# Patient Record
Sex: FEMALE | URBAN - METROPOLITAN AREA
[De-identification: names, ages, dates, MRNs, and addresses within clinical notes are randomized per-mention and may not be internally consistent; named-entity substitution may affect disease eponyms.]

---

## 2019-08-20 ENCOUNTER — APPOINTMENT (RX ONLY)
Dept: URBAN - METROPOLITAN AREA CLINIC 40 | Facility: CLINIC | Age: 59
Setting detail: DERMATOLOGY
End: 2019-08-20

## 2019-08-20 DIAGNOSIS — L259 CONTACT DERMATITIS AND OTHER ECZEMA, UNSPECIFIED CAUSE: ICD-10-CM

## 2019-08-20 DIAGNOSIS — B00.1 HERPESVIRAL VESICULAR DERMATITIS: ICD-10-CM

## 2019-08-20 PROBLEM — L23.9 ALLERGIC CONTACT DERMATITIS, UNSPECIFIED CAUSE: Status: ACTIVE | Noted: 2019-08-20

## 2019-08-20 PROBLEM — L08.9 LOCAL INFECTION OF THE SKIN AND SUBCUTANEOUS TISSUE, UNSPECIFIED: Status: ACTIVE | Noted: 2019-08-20

## 2019-08-20 PROCEDURE — ? PRESCRIPTION MEDICATION MANAGEMENT

## 2019-08-20 PROCEDURE — ? PRESCRIPTION

## 2019-08-20 PROCEDURE — 99203 OFFICE O/P NEW LOW 30 MIN: CPT

## 2019-08-20 PROCEDURE — ? COUNSELING

## 2019-08-20 PROCEDURE — ? ADDITIONAL NOTES

## 2019-08-20 RX ORDER — TRIAMCINOLONE ACETONIDE 0.1 %
OINTMENT (GRAM) TOPICAL
Qty: 1 | Refills: 0 | Status: ERX | COMMUNITY
Start: 2019-08-20

## 2019-08-20 RX ORDER — FLUCONAZOLE 150 MG/1
TABLET ORAL
Qty: 7 | Refills: 0 | Status: ERX | COMMUNITY
Start: 2019-08-20

## 2019-08-20 RX ADMIN — FLUCONAZOLE: 150 TABLET ORAL at 18:46

## 2019-08-20 RX ADMIN — Medication: at 18:48

## 2019-08-20 ASSESSMENT — LOCATION ZONE DERM
LOCATION ZONE: FACE
LOCATION ZONE: TRUNK
LOCATION ZONE: LIP

## 2019-08-20 ASSESSMENT — LOCATION SIMPLE DESCRIPTION DERM
LOCATION SIMPLE: RIGHT LOWER BACK
LOCATION SIMPLE: LEFT CHEEK
LOCATION SIMPLE: UPPER LIP

## 2019-08-20 ASSESSMENT — LOCATION DETAILED DESCRIPTION DERM
LOCATION DETAILED: LEFT SUPERIOR CENTRAL BUCCAL CHEEK
LOCATION DETAILED: PHILTRUM
LOCATION DETAILED: RIGHT SUPERIOR LATERAL MIDBACK

## 2019-08-20 NOTE — PROCEDURE: PRESCRIPTION MEDICATION MANAGEMENT
Samples Given: - Vanicream cleanser
Discontinue Regimen: - All OTC products including make up and lotions
Otc Regimen: - Vanicream products\\n
Initiate Treatment: - TAC ointment \\n- Diflucan 150mg PO QD
Render In Strict Bullet Format?: No
Plan: -avoid lipstick and cosmetic products until next visit
Detail Level: Zone

## 2019-08-20 NOTE — HPI: RASH
What Type Of Note Output Would You Prefer (Optional)?: Standard Output
How Severe Is Your Rash?: moderate
Is This A New Presentation, Or A Follow-Up?: Rash
Additional History: \\n\\nPt states rash appeared on lips first then spread to left cheek and rest of face. Pt was seen by urgent care and was Rx mupiricin and sulfamethoxazole with no relief. Pt went to ED 1D ago was dx with fungus, Rx nystatin applied twice but states it has not helped.

## 2019-08-20 NOTE — PROCEDURE: ADDITIONAL NOTES
Additional Notes: - Patient denies pain, advised to observe for changes
Detail Level: Simple
Additional Notes: - Pt denies any pain, just mild itching. May just be eczematous lesion, but counseled that if symptoms change, pain or tingling occurs, to call office and will start valtrex

## 2019-08-28 ENCOUNTER — APPOINTMENT (RX ONLY)
Dept: URBAN - METROPOLITAN AREA CLINIC 40 | Facility: CLINIC | Age: 59
Setting detail: DERMATOLOGY
End: 2019-08-28

## 2019-08-28 DIAGNOSIS — L81.4 OTHER MELANIN HYPERPIGMENTATION: ICD-10-CM

## 2019-08-28 DIAGNOSIS — L259 CONTACT DERMATITIS AND OTHER ECZEMA, UNSPECIFIED CAUSE: ICD-10-CM | Status: IMPROVED

## 2019-08-28 PROBLEM — L30.9 DERMATITIS, UNSPECIFIED: Status: ACTIVE | Noted: 2019-08-28

## 2019-08-28 PROCEDURE — ? COUNSELING

## 2019-08-28 PROCEDURE — ? PRESCRIPTION

## 2019-08-28 PROCEDURE — ? ADDITIONAL NOTES

## 2019-08-28 PROCEDURE — 99213 OFFICE O/P EST LOW 20 MIN: CPT

## 2019-08-28 PROCEDURE — ? PRESCRIPTION MEDICATION MANAGEMENT

## 2019-08-28 RX ORDER — HYDROCORTISONE 25 MG/G
CREAM TOPICAL
Qty: 1 | Refills: 0 | Status: ERX | COMMUNITY
Start: 2019-08-28

## 2019-08-28 RX ADMIN — HYDROCORTISONE: 25 CREAM TOPICAL at 15:45

## 2019-08-28 ASSESSMENT — LOCATION ZONE DERM
LOCATION ZONE: LIP
LOCATION ZONE: FACE

## 2019-08-28 ASSESSMENT — LOCATION DETAILED DESCRIPTION DERM
LOCATION DETAILED: RIGHT CENTRAL MALAR CHEEK
LOCATION DETAILED: LEFT CENTRAL MALAR CHEEK
LOCATION DETAILED: PHILTRUM
LOCATION DETAILED: LEFT SUPERIOR CENTRAL BUCCAL CHEEK

## 2019-08-28 ASSESSMENT — LOCATION SIMPLE DESCRIPTION DERM
LOCATION SIMPLE: UPPER LIP
LOCATION SIMPLE: LEFT CHEEK
LOCATION SIMPLE: RIGHT CHEEK

## 2019-08-28 NOTE — PROCEDURE: PRESCRIPTION MEDICATION MANAGEMENT
Discontinue Regimen: Triamcinolone
Render In Strict Bullet Format?: No
Initiate Treatment: Hydrocortisone Cream 2.5% BID x1W
Detail Level: Zone

## 2019-08-28 NOTE — PROCEDURE: ADDITIONAL NOTES
Additional Notes: Pt advised to follow up in 1 month to start creams. Will wait for ACD to resolve first. Recommended using physical mineral SPF
Detail Level: Simple

## 2021-03-01 ENCOUNTER — OFFICE (OUTPATIENT)
Dept: URBAN - METROPOLITAN AREA CLINIC 79 | Facility: CLINIC | Age: 61
End: 2021-03-01
Payer: MEDICAID

## 2021-03-01 VITALS
HEART RATE: 77 BPM | WEIGHT: 179.4 LBS | SYSTOLIC BLOOD PRESSURE: 136 MMHG | TEMPERATURE: 97 F | HEIGHT: 65 IN | DIASTOLIC BLOOD PRESSURE: 82 MMHG

## 2021-03-01 DIAGNOSIS — E87.8 OTHER DISORDERS OF ELECTROLYTE AND FLUID BALANCE, NOT ELSEWH: ICD-10-CM

## 2021-03-01 DIAGNOSIS — K76.0 FATTY (CHANGE OF) LIVER, NOT ELSEWHERE CLASSIFIED: ICD-10-CM

## 2021-03-01 DIAGNOSIS — E11.9 TYPE 2 DIABETES MELLITUS WITHOUT COMPLICATIONS: ICD-10-CM

## 2021-03-01 DIAGNOSIS — E66.9 OBESITY, UNSPECIFIED: ICD-10-CM

## 2021-03-01 DIAGNOSIS — R19.8 OTHER SPECIFIED SYMPTOMS AND SIGNS INVOLVING THE DIGESTIVE S: ICD-10-CM

## 2021-03-01 PROCEDURE — 99204 OFFICE O/P NEW MOD 45 MIN: CPT | Performed by: PHYSICIAN ASSISTANT

## 2021-03-01 NOTE — SERVICEHPINOTES
JUN SANDOVAL   is a   60   year old [place]     female who is being seen in consultation at the request of   RADHA BUCHANAN   for elevated LFTs. Reviewed labs by PCP from 01/07/2021 ASt 93/ALT 83, alk phos 140, no anemia, no leukocytosis, normal TSH/T4, HLD given total cholesterol 220. She had recent RUQ u/s at [facility] end of 11/2020 noting fatty liver (requesting records). She has DMT2 on Metformin 500 mg qd Last 02/2021 hgA1c 6.7%. No ETOH abuse or herbal supplement use. No fm h/o liver disease. No h/o viral hepatitis, IV drug use, blood transfusion-donations, piercings/tattoos, high risk sexual partners. No other complaints.

## 2021-03-18 ENCOUNTER — OFFICE (OUTPATIENT)
Dept: URBAN - METROPOLITAN AREA CLINIC 102 | Facility: CLINIC | Age: 61
End: 2021-03-18
Payer: MEDICAID

## 2021-03-18 DIAGNOSIS — R19.8 OTHER SPECIFIED SYMPTOMS AND SIGNS INVOLVING THE DIGESTIVE S: ICD-10-CM

## 2021-03-18 PROCEDURE — 91200 LIVER ELASTOGRAPHY: CPT | Performed by: INTERNAL MEDICINE

## 2021-05-20 ENCOUNTER — OFFICE (OUTPATIENT)
Dept: URBAN - METROPOLITAN AREA CLINIC 79 | Facility: CLINIC | Age: 61
End: 2021-05-20

## 2021-05-20 ENCOUNTER — OFFICE (OUTPATIENT)
Dept: URBAN - METROPOLITAN AREA CLINIC 79 | Facility: CLINIC | Age: 61
End: 2021-05-20
Payer: MEDICAID

## 2021-05-20 VITALS
SYSTOLIC BLOOD PRESSURE: 144 MMHG | TEMPERATURE: 97.1 F | DIASTOLIC BLOOD PRESSURE: 88 MMHG | HEIGHT: 65 IN | HEART RATE: 73 BPM | WEIGHT: 172 LBS

## 2021-05-20 DIAGNOSIS — R11.0 NAUSEA: ICD-10-CM

## 2021-05-20 DIAGNOSIS — K76.0 FATTY (CHANGE OF) LIVER, NOT ELSEWHERE CLASSIFIED: ICD-10-CM

## 2021-05-20 DIAGNOSIS — R12 HEARTBURN: ICD-10-CM

## 2021-05-20 DIAGNOSIS — R11.10 VOMITING, UNSPECIFIED: ICD-10-CM

## 2021-05-20 DIAGNOSIS — E11.9 TYPE 2 DIABETES MELLITUS WITHOUT COMPLICATIONS: ICD-10-CM

## 2021-05-20 DIAGNOSIS — R19.8 OTHER SPECIFIED SYMPTOMS AND SIGNS INVOLVING THE DIGESTIVE S: ICD-10-CM

## 2021-05-20 DIAGNOSIS — Z12.11 ENCOUNTER FOR SCREENING FOR MALIGNANT NEOPLASM OF COLON: ICD-10-CM

## 2021-05-20 PROCEDURE — 00031: CPT | Performed by: INTERNAL MEDICINE

## 2021-05-20 PROCEDURE — 99214 OFFICE O/P EST MOD 30 MIN: CPT | Performed by: PHYSICIAN ASSISTANT

## 2021-05-20 NOTE — SERVICEHPINOTES
JUN SANDOVAL   is a   61 yo [place]  female who complains of chronic nausea and due for CRC screening due to age. She was last seen in 03/2021 due to elevated LFTs that led to additional lab work and Fibroscan. She informs of blood work not done Will provide lab order again.  She has DMT2 on Metformin 500 mg qd Last 02/2021 hgA1c 6.7%. She has BMs  daily, BSS type 4-5: No blood in stool. No ETOH abuse. No fm h/o liver disease. No h/o viral hepatitis. Denies chest pain, dysphagia, abdominal pain, change in BMs, melena, rectal bleeding, weight loss. No other complaints. BR

## 2021-06-28 ENCOUNTER — AMBULATORY SURGICAL CENTER (OUTPATIENT)
Dept: URBAN - METROPOLITAN AREA SURGERY 23 | Facility: SURGERY | Age: 61
End: 2021-06-28
Payer: MEDICAID

## 2021-06-28 DIAGNOSIS — R11.2 NAUSEA WITH VOMITING, UNSPECIFIED: ICD-10-CM

## 2021-06-28 DIAGNOSIS — D12.2 BENIGN NEOPLASM OF ASCENDING COLON: ICD-10-CM

## 2021-06-28 DIAGNOSIS — D12.0 BENIGN NEOPLASM OF CECUM: ICD-10-CM

## 2021-06-28 DIAGNOSIS — Z12.11 ENCOUNTER FOR SCREENING FOR MALIGNANT NEOPLASM OF COLON: ICD-10-CM

## 2021-06-28 PROCEDURE — 43239 EGD BIOPSY SINGLE/MULTIPLE: CPT | Performed by: INTERNAL MEDICINE

## 2021-06-28 PROCEDURE — 45380 COLONOSCOPY AND BIOPSY: CPT | Mod: PT | Performed by: INTERNAL MEDICINE

## 2021-09-20 ENCOUNTER — OFFICE (OUTPATIENT)
Dept: URBAN - METROPOLITAN AREA CLINIC 79 | Facility: CLINIC | Age: 61
End: 2021-09-20
Payer: MEDICAID

## 2021-09-20 VITALS
DIASTOLIC BLOOD PRESSURE: 79 MMHG | SYSTOLIC BLOOD PRESSURE: 120 MMHG | TEMPERATURE: 97.1 F | HEIGHT: 65 IN | HEART RATE: 74 BPM | WEIGHT: 170 LBS

## 2021-09-20 DIAGNOSIS — K76.0 FATTY (CHANGE OF) LIVER, NOT ELSEWHERE CLASSIFIED: ICD-10-CM

## 2021-09-20 DIAGNOSIS — K21.9 GASTRO-ESOPHAGEAL REFLUX DISEASE WITHOUT ESOPHAGITIS: ICD-10-CM

## 2021-09-20 PROCEDURE — 99214 OFFICE O/P EST MOD 30 MIN: CPT | Performed by: PHYSICIAN ASSISTANT

## 2021-09-20 NOTE — SERVICEHPINOTES
JUN SANDOVAL   is a   59 yo [place]  female who is here for f/u GERD and fatty liver. Her 06/2021 EGD noted at [facility] evidence of reflux (bx no Corado's disease), mild chronic gastritis (bx benign), and esophagus bx  wnl's. Since endoscopy she has been on Omeprazole 20 mg qd that has been effective as reducing her heartburn and nausea. Concerning her abnormla LFTs, this was further evaluated at last visit with additional labs and fibroscan: F0/S3.  She has DMT2 on Metformin 500 mg qd hgA1c 6.7%. She has BMs daily, BSS type 4-5: No blood in stool. No ETOH abuse. Denies chest pain, dysphagia, abdominal pain, change in BMs, melena, rectal bleeding, weight loss. No other complaints.br
br
br

## 2021-09-20 NOTE — SERVICENOTES
I have reviewed the history, physical exam, assessment and management plans.  I concur with or have edited all elements of her note. Simponi Counseling:  I discussed with the patient the risks of golimumab including but not limited to myelosuppression, immunosuppression, autoimmune hepatitis, demyelinating diseases, lymphoma, and serious infections.  The patient understands that monitoring is required including a PPD at baseline and must alert us or the primary physician if symptoms of infection or other concerning signs are noted.

## 2022-02-07 ENCOUNTER — OFFICE (OUTPATIENT)
Dept: URBAN - METROPOLITAN AREA CLINIC 102 | Facility: CLINIC | Age: 62
End: 2022-02-07
Payer: MEDICAID

## 2022-02-07 DIAGNOSIS — R74.8 ABNORMAL LEVELS OF OTHER SERUM ENZYMES: ICD-10-CM

## 2022-02-07 DIAGNOSIS — K76.0 FATTY (CHANGE OF) LIVER, NOT ELSEWHERE CLASSIFIED: ICD-10-CM

## 2022-02-07 PROCEDURE — 91200 LIVER ELASTOGRAPHY: CPT | Performed by: INTERNAL MEDICINE

## 2023-06-27 ENCOUNTER — OFFICE (OUTPATIENT)
Dept: URBAN - METROPOLITAN AREA CLINIC 34 | Facility: CLINIC | Age: 63
End: 2023-06-27
Payer: MEDICAID

## 2023-06-27 VITALS
WEIGHT: 177 LBS | HEART RATE: 74 BPM | DIASTOLIC BLOOD PRESSURE: 73 MMHG | TEMPERATURE: 97.5 F | SYSTOLIC BLOOD PRESSURE: 116 MMHG | HEIGHT: 65 IN

## 2023-06-27 DIAGNOSIS — R11.2 NAUSEA WITH VOMITING, UNSPECIFIED: ICD-10-CM

## 2023-06-27 DIAGNOSIS — R10.13 EPIGASTRIC PAIN: ICD-10-CM

## 2023-06-27 PROCEDURE — 99214 OFFICE O/P EST MOD 30 MIN: CPT | Performed by: NURSE PRACTITIONER

## 2023-06-27 NOTE — SERVICEHPINOTES
JUN SANDOVAL   is a   62  female who presents with stomach problems. Occasional nausea and vomiting. Pt wishes to have another EGD.  Denies hematemesis. Restarted taking pantoprazole 40 mg once daily 6 months ago, the acid reflux is controlled. 
brOccasional epigastric pain. Denies weight loss. 
br Moves bowel daily. Denies blood in stool, melena, constipation, diarrhea or lower abdominal pain. 
brTakes Ibuprofen 400 mg 1-2 times a week. 
br
br Denies family hx of stomach cancer. 
br Denies personal hx of CVD. br

## 2023-08-14 ENCOUNTER — OFFICE (OUTPATIENT)
Dept: URBAN - METROPOLITAN AREA CLINIC 30 | Facility: CLINIC | Age: 63
End: 2023-08-14
Payer: MEDICAID

## 2023-08-14 VITALS
OXYGEN SATURATION: 94 % | DIASTOLIC BLOOD PRESSURE: 74 MMHG | OXYGEN SATURATION: 98 % | WEIGHT: 177 LBS | SYSTOLIC BLOOD PRESSURE: 121 MMHG | HEART RATE: 72 BPM | HEART RATE: 91 BPM | HEIGHT: 65 IN | SYSTOLIC BLOOD PRESSURE: 153 MMHG | SYSTOLIC BLOOD PRESSURE: 114 MMHG | RESPIRATION RATE: 12 BRPM | HEART RATE: 87 BPM | SYSTOLIC BLOOD PRESSURE: 109 MMHG | OXYGEN SATURATION: 99 % | DIASTOLIC BLOOD PRESSURE: 82 MMHG | DIASTOLIC BLOOD PRESSURE: 72 MMHG | RESPIRATION RATE: 9 BRPM | TEMPERATURE: 97.3 F | DIASTOLIC BLOOD PRESSURE: 62 MMHG | RESPIRATION RATE: 16 BRPM | DIASTOLIC BLOOD PRESSURE: 81 MMHG | SYSTOLIC BLOOD PRESSURE: 145 MMHG | TEMPERATURE: 97.9 F | OXYGEN SATURATION: 95 % | HEART RATE: 82 BPM

## 2023-08-14 DIAGNOSIS — K29.50 UNSPECIFIED CHRONIC GASTRITIS WITHOUT BLEEDING: ICD-10-CM

## 2023-08-14 DIAGNOSIS — K22.89 OTHER SPECIFIED DISEASE OF ESOPHAGUS: ICD-10-CM

## 2023-08-14 DIAGNOSIS — R10.13 EPIGASTRIC PAIN: ICD-10-CM

## 2023-08-14 DIAGNOSIS — R11.2 NAUSEA WITH VOMITING, UNSPECIFIED: ICD-10-CM

## 2023-08-14 PROBLEM — K31.89 OTHER DISEASES OF STOMACH AND DUODENUM: Status: ACTIVE | Noted: 2023-08-14
